# Patient Record
Sex: FEMALE | Race: WHITE
[De-identification: names, ages, dates, MRNs, and addresses within clinical notes are randomized per-mention and may not be internally consistent; named-entity substitution may affect disease eponyms.]

---

## 2019-10-28 ENCOUNTER — HOSPITAL ENCOUNTER (EMERGENCY)
Dept: HOSPITAL 96 - M.ERS | Age: 44
Discharge: HOME | End: 2019-10-28
Payer: COMMERCIAL

## 2019-10-28 VITALS — WEIGHT: 170 LBS | BODY MASS INDEX: 31.28 KG/M2 | HEIGHT: 62 IN

## 2019-10-28 VITALS — DIASTOLIC BLOOD PRESSURE: 76 MMHG | SYSTOLIC BLOOD PRESSURE: 124 MMHG

## 2019-10-28 DIAGNOSIS — F17.210: ICD-10-CM

## 2019-10-28 DIAGNOSIS — K29.70: Primary | ICD-10-CM

## 2019-10-28 DIAGNOSIS — Z98.51: ICD-10-CM

## 2019-10-28 DIAGNOSIS — Z88.6: ICD-10-CM

## 2019-10-28 DIAGNOSIS — Z88.5: ICD-10-CM

## 2019-10-28 LAB
ABSOLUTE BASOPHILS: 0.1 THOU/UL (ref 0–0.2)
ABSOLUTE EOSINOPHILS: 0.2 THOU/UL (ref 0–0.7)
ABSOLUTE MONOCYTES: 0.5 THOU/UL (ref 0–1.2)
ALBUMIN SERPL-MCNC: 3.5 G/DL (ref 3.4–5)
ALP SERPL-CCNC: 51 U/L (ref 46–116)
ALT SERPL-CCNC: 28 U/L (ref 30–65)
ANION GAP SERPL CALC-SCNC: 9 MMOL/L (ref 7–16)
AST SERPL-CCNC: 18 U/L (ref 15–37)
BASOPHILS NFR BLD AUTO: 0.9 %
BILIRUB SERPL-MCNC: 0.2 MG/DL
BILIRUB UR-MCNC: NEGATIVE MG/DL
BUN SERPL-MCNC: 12 MG/DL (ref 7–18)
CALCIUM SERPL-MCNC: 9.3 MG/DL (ref 8.5–10.1)
CHLORIDE SERPL-SCNC: 107 MMOL/L (ref 98–107)
CO2 SERPL-SCNC: 25 MMOL/L (ref 21–32)
COLOR UR: YELLOW
CREAT SERPL-MCNC: 0.9 MG/DL (ref 0.6–1.3)
EOSINOPHIL NFR BLD: 3 %
GLUCOSE SERPL-MCNC: 88 MG/DL (ref 70–99)
GRANULOCYTES NFR BLD MANUAL: 62 %
HCT VFR BLD CALC: 40.4 % (ref 37–47)
HGB BLD-MCNC: 13.9 GM/DL (ref 12–15)
KETONES UR STRIP-MCNC: NEGATIVE MG/DL
LIPASE: 187 U/L (ref 73–393)
LYMPHOCYTES # BLD: 2.1 THOU/UL (ref 0.8–5.3)
LYMPHOCYTES NFR BLD AUTO: 27.2 %
MCH RBC QN AUTO: 31.9 PG (ref 26–34)
MCHC RBC AUTO-ENTMCNC: 34.5 G/DL (ref 28–37)
MCV RBC: 92.3 FL (ref 80–100)
MONOCYTES NFR BLD: 6.9 %
MPV: 9 FL. (ref 7.2–11.1)
NEUTROPHILS # BLD: 4.7 THOU/UL (ref 1.6–8.1)
NUCLEATED RBCS: 0 /100WBC
PLATELET COUNT*: 214 THOU/UL (ref 150–400)
POTASSIUM SERPL-SCNC: 3.7 MMOL/L (ref 3.5–5.1)
PROT SERPL-MCNC: 6.6 G/DL (ref 6.4–8.2)
PROT UR QL STRIP: NEGATIVE
RBC # BLD AUTO: 4.38 MIL/UL (ref 4.2–5)
RBC # UR STRIP: NEGATIVE /UL
RDW-CV: 13.9 % (ref 10.5–14.5)
SODIUM SERPL-SCNC: 141 MMOL/L (ref 136–145)
SP GR UR STRIP: 1.02 (ref 1–1.03)
URINE CLARITY: CLEAR
URINE GLUCOSE-RANDOM: NEGATIVE
URINE LEUKOCYTES-REFLEX: NEGATIVE
URINE NITRITE-REFLEX: NEGATIVE
UROBILINOGEN UR STRIP-ACNC: 0.2 E.U./DL (ref 0.2–1)
WBC # BLD AUTO: 7.5 THOU/UL (ref 4–11)

## 2019-10-29 NOTE — EKG
[place] [address]
Phone:  [phone]                     ELECTROCARDIOGRAM REPORT      
_______________________________________________________________________________
 
Name:       GREYEYES,SONIA M               Room:                      [facility]#:  [id number]      Account #:      [id number]  
Admission:  10/28/19     Attend Phys:                         
Discharge:  10/28/19     Date of Birth:  [birth year]  
         Report #: [id number]
    [id number]
_______________________________________________________________________________
THIS REPORT FOR:  //name//                      
 
                         [facility] ED
                                       
Test Date:    2019-10-28               Test Time:    19:21:51
Pat Name:     SONIA GREYEYES           Department:   
Patient ID:   [id number]            Room:          
Gender:       F                        Technician:   FL
:          1975               Requested By: Benson Baker
Order Number: [id number] MD:   Derrell Buckner
                                 Measurements
Intervals                              Axis          
Rate:         84                       P:            59
IN:           150                      QRS:          17
QRSD:         101                      T:            41
QT:           366                                    
QTc:          433                                    
                           Interpretive Statements
Sinus rhythm
No previous ECG available for comparison
 
Electronically Signed On 10- 9:53:49 CDT by Derrell Buckner
[email or web address]
 
 
 
 
 
 
 
 
 
 
 
 
 
 
 
 
 
 
 
 
  <ELECTRONICALLY SIGNED>
                                           By: Derrell Buckner MD, [facility]      
  10/29/19     0953
D: 10/28/19 1921   _____________________________________
T: 10/28/19 192   Derrell Buckner MD, FACC        /EPI